# Patient Record
Sex: MALE | Race: WHITE
[De-identification: names, ages, dates, MRNs, and addresses within clinical notes are randomized per-mention and may not be internally consistent; named-entity substitution may affect disease eponyms.]

---

## 2019-04-14 ENCOUNTER — HOSPITAL ENCOUNTER (EMERGENCY)
Dept: HOSPITAL 11 - JP.ED | Age: 39
Discharge: HOME | End: 2019-04-14
Payer: COMMERCIAL

## 2019-04-14 DIAGNOSIS — S62.655A: Primary | ICD-10-CM

## 2019-04-14 DIAGNOSIS — S61.225A: ICD-10-CM
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
## 2019-04-14 PROCEDURE — 99284 EMERGENCY DEPT VISIT MOD MDM: CPT

## 2019-04-14 PROCEDURE — 73140 X-RAY EXAM OF FINGER(S): CPT

## 2019-04-14 PROCEDURE — 12002 RPR S/N/AX/GEN/TRNK2.6-7.5CM: CPT

## 2019-04-14 NOTE — CRLCR
Indication:



Injury and pain



Technique:



Left 4th finger 3 views



Comparison:



None



Findings/Impression:



Bones: Transverse nondisplaced fracture is in the proximal shaft of the 

middle phalanx within adjacent soft tissue injury. Tiny elements of 

shrapnel are also in this location. No other osseous abnormality. 



Joint spaces: Unremarkable.



Dictated by Joseph Duarte MD @ Apr 14 2019  5:17PM



Signed by Dr. Joseph Duarte @ Apr 14 2019  5:20PM
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
## 2020-05-16 NOTE — EDM.PDOC
ED HPI GENERAL MEDICAL PROBLEM





- General


Chief Complaint: Back Pain or Injury


Stated Complaint: BACK PAIN


Time Seen by Provider: 05/16/20 22:00


Source of Information: Reports: Patient, RN Notes Reviewed


History Limitations: Reports: No Limitations





- History of Present Illness


INITIAL COMMENTS - FREE TEXT/NARRATIVE: 





39-year-old gentleman presents emergency department today with complaint of low 

back pain he injured himself a couple hours prior where he slipped fell down 

the steps on a deck landed on his backside he states he is ambulating okay but 

he gets in a certain position and he will get significant pain and muscle 

spasms in his low back no loss of bowel or bladder





- Related Data


 Allergies











Allergy/AdvReac Type Severity Reaction Status Date / Time


 


amoxicillin Allergy  Diarrhea Verified 05/16/20 21:53











Home Meds: 


 Home Meds





Citalopram [Citalopram HBr] 20 mg PO BEDTIME 07/31/18 [History]


Lisinopril 10 mg PO BEDTIME 07/31/18 [History]


hydroCHLOROthiazide [Hydrochlorothiazide] 25 mg PO DAILY 04/14/19 [History]











Past Medical History


Cardiovascular History: Reports: Hypertension


Musculoskeletal History: Reports: Fracture


Other Musculoskeletal History: left hand 4th digit


Psychiatric History: Reports: Anxiety





- Infectious Disease History


Infectious Disease History: Reports: Chicken Pox





- Past Surgical History


Head Surgeries/Procedures: Reports: None


Musculoskeletal Surgical History: Reports: None





Social & Family History





- Tobacco Use


Smoking Status *Q: Never Smoker





- Caffeine Use


Caffeine Use: Reports: Soda





ED ROS GENERAL





- Review of Systems


Review Of Systems: See Below


Constitutional: Reports: No Symptoms


GI/Abdominal: Reports: No Symptoms


Musculoskeletal: Reports: Back Pain


Neurological: Reports: No Symptoms





ED EXAM,LOWER BACK PAIN/INJURY





- Physical Exam


Exam: See Below


Exam Limited By: No Limitations


General Appearance: Alert, Mild Distress


Respiratory/Chest: No Respiratory Distress


Back Exam: Normal Inspection, Decreased Range of Motion, Muscle Spasm, 

Paraspinal Tenderness.  No: CVA Tenderness (R), CVA Tenderness (L), Vertebral 

Tenderness





Course





- Vital Signs


Last Recorded V/S: 


 Last Vital Signs











Temp  97.7 F   05/16/20 21:57


 


Pulse  87   05/16/20 21:57


 


Resp  16   05/16/20 21:57


 


BP  166/113 H  05/16/20 21:57


 


Pulse Ox  92 L  05/16/20 21:57














- Orders/Labs/Meds


Meds: 


Medications














Discontinued Medications














Generic Name Dose Route Start Last Admin





  Trade Name Lety  PRN Reason Stop Dose Admin


 


Cyclobenzaprine HCl  10 mg  05/16/20 22:03  05/16/20 22:10





  Flexeril  PO  05/16/20 22:04  10 mg





  ONETIME ONE   Administration





     





     





     





     


 


Fentanyl  50 mcg  05/16/20 22:03  05/16/20 22:10





  Sublimaze  IM  05/16/20 22:04  50 mcg





  ONETIME ONE   Administration





     





     





     





     














Departure





- Departure


Time of Disposition: 22:26


Disposition: Home, Self-Care 01


Condition: Fair


Clinical Impression: 


Low back pain


Qualifiers:


 Chronicity: acute Back pain laterality: bilateral Sciatica presence: without 

sciatica Qualified Code(s): M54.5 - Low back pain








- Discharge Information


Instructions:  Acute Back Pain, Adult


Referrals: 


Jaguar Wheeler MD [Primary Care Provider] - 


Forms:  ED Department Discharge


Additional Instructions: 


Use ibuprofen for baseline pain control, use hydrocodone for breakthrough pain, 

use Flexeril as needed for muscle relaxant,  please followup with your primary 

care provider in 3-5 days if not better, please call return to the emergency 

department with worsening of symptoms.





Sepsis Event Note





- Evaluation


Sepsis Screening Result: No Definite Risk





- Focused Exam


Vital Signs: 


 Vital Signs











  Temp Pulse Resp BP Pulse Ox


 


 05/16/20 21:57  97.7 F  87  16  166/113 H  92 L


 


 05/16/20 21:49  97.7 F  87  16  166/113 H  92 L











Date Exam was Performed: 05/16/20


Time Exam was Performed: 22:25





- Assessment/Plan


Plan: 





Assessment





Acuity = acute





Site and laterality = low back pain with muscle spasm





Etiology  = secondary to a fall





Manifestations = none





Location of injury =  Home





Lab values = none





Plan


He had good improvement combination fentanyl and Flexeril discharged home 

hydrocodone 5/325 1 tab p.o. 3 times daily PRN total #10 Flexeril 10 mg 1 tab 

p.o. 3 times daily PRN total 15 follow-up primary care 3 to 5 days if not better

















 This note was dictated using dragon voice recognition software please call 

with any questions on syntax or grammar.